# Patient Record
Sex: FEMALE | Race: WHITE | NOT HISPANIC OR LATINO | Employment: OTHER | URBAN - METROPOLITAN AREA
[De-identification: names, ages, dates, MRNs, and addresses within clinical notes are randomized per-mention and may not be internally consistent; named-entity substitution may affect disease eponyms.]

---

## 2017-04-13 ENCOUNTER — IMPORTED ENCOUNTER (OUTPATIENT)
Dept: URBAN - METROPOLITAN AREA CLINIC 43 | Facility: CLINIC | Age: 37
End: 2017-04-13

## 2017-04-13 PROBLEM — H33.312: Noted: 2017-04-13

## 2017-04-13 PROBLEM — H16.223: Noted: 2017-04-13

## 2017-11-28 ENCOUNTER — IMPORTED ENCOUNTER (OUTPATIENT)
Dept: URBAN - METROPOLITAN AREA CLINIC 43 | Facility: CLINIC | Age: 37
End: 2017-11-28

## 2017-11-28 PROBLEM — T15.11XA: Noted: 2017-11-28

## 2018-04-23 ENCOUNTER — IMPORTED ENCOUNTER (OUTPATIENT)
Dept: URBAN - METROPOLITAN AREA CLINIC 43 | Facility: CLINIC | Age: 38
End: 2018-04-23

## 2018-04-23 PROBLEM — H16.223: Noted: 2018-04-23

## 2018-04-25 ENCOUNTER — IMPORTED ENCOUNTER (OUTPATIENT)
Dept: URBAN - METROPOLITAN AREA CLINIC 43 | Facility: CLINIC | Age: 38
End: 2018-04-25

## 2019-04-08 ENCOUNTER — IMPORTED ENCOUNTER (OUTPATIENT)
Dept: URBAN - METROPOLITAN AREA CLINIC 43 | Facility: CLINIC | Age: 39
End: 2019-04-08

## 2019-04-08 PROBLEM — H16.223: Noted: 2019-04-08

## 2020-04-19 ASSESSMENT — TONOMETRY
OS_IOP_MMHG: 12.0
OS_IOP_MMHG: 11.0
OS_IOP_MMHG: 12.0
OD_IOP_MMHG: 12.0
OD_IOP_MMHG: 11.0
OD_IOP_MMHG: 10.0
OS_IOP_MMHG: 13.0
OD_IOP_MMHG: 10.0

## 2020-04-19 ASSESSMENT — VISUAL ACUITY
OS_CC: 20/20
OS_CC: J1
OD_CC: 20/20
OD_SC: J1
OS_CC: J1+
OD_SC: CF 2'
OD_CC: 20/20
OD_SC: CF 2'
OS_SC: J1
OS_SC: CF 2'
OD_CC: J1
OS_CC: 20/20
OD_CC: 20/20
OD_SC: CF 2'
OS_CC: 20/20
OS_SC: J1+
OS_CC: 20/20
OD_SC: J1+
OD_SC: J1+
OS_SC: CF 2'
OD_CC: J1+
OD_CC: 20/20
OS_SC: CF 2'
OS_SC: J1+

## 2020-04-19 ASSESSMENT — KERATOMETRY
OD_AXISANGLE2_DEGREES: 179
OD_AXISANGLE2_DEGREES: 1
OD_K1POWER_DIOPTERS: 42.75
OS_K1POWER_DIOPTERS: 43
OS_AXISANGLE_DEGREES: 65
OS_AXISANGLE2_DEGREES: 166
OS_K2POWER_DIOPTERS: 42.5
OD_K2POWER_DIOPTERS: 42.5
OS_AXISANGLE2_DEGREES: 155
OD_K1POWER_DIOPTERS: 43
OS_AXISANGLE_DEGREES: 76
OD_AXISANGLE_DEGREES: 83
OD_AXISANGLE_DEGREES: 91
OS_K2POWER_DIOPTERS: 42.25
OD_K2POWER_DIOPTERS: 42.25
OS_K1POWER_DIOPTERS: 42.75

## 2020-05-22 ENCOUNTER — CONTACT LENS EXAM ESTABLISHED (OUTPATIENT)
Dept: URBAN - METROPOLITAN AREA CLINIC 32 | Facility: CLINIC | Age: 40
End: 2020-05-22

## 2020-05-22 DIAGNOSIS — H16.223: ICD-10-CM

## 2020-05-22 PROCEDURE — 92310-1 LEVEL 1 CONTACT LENS MANAGEMENT

## 2020-05-22 PROCEDURE — 92014 COMPRE OPH EXAM EST PT 1/>: CPT

## 2020-05-22 ASSESSMENT — KERATOMETRY
OS_AXISANGLE2_DEGREES: 73
OD_K2POWER_DIOPTERS: 42.25
OS_AXISANGLE2_DEGREES: 65
OS_AXISANGLE_DEGREES: 155
OD_AXISANGLE2_DEGREES: 85
OD_AXISANGLE2_DEGREES: 85
OD_AXISANGLE_DEGREES: 175
OD_K2POWER_DIOPTERS: 42.75
OS_K2POWER_DIOPTERS: 43
OD_K1POWER_DIOPTERS: 42.25
OD_K1POWER_DIOPTERS: 42.75
OD_AXISANGLE2_DEGREES: 88
OD_AXISANGLE_DEGREES: 175
OD_AXISANGLE_DEGREES: 178
OS_K2POWER_DIOPTERS: 42.25
OS_K1POWER_DIOPTERS: 42.5
OS_K2POWER_DIOPTERS: 43
OD_K2POWER_DIOPTERS: 42.75
OS_K1POWER_DIOPTERS: 42.75
OS_K1POWER_DIOPTERS: 42.5
OS_AXISANGLE2_DEGREES: 65
OS_AXISANGLE_DEGREES: 163
OS_AXISANGLE_DEGREES: 155
OD_K1POWER_DIOPTERS: 42.25

## 2020-05-22 ASSESSMENT — VISUAL ACUITY
OD_SC: CF 2FT
OS_SC: CF 2FT
OD_SC: J1
OD_CC: J1+
OS_SC: J1
OS_CC: J1+
OD_CC: 20/20
OS_CC: 20/20

## 2020-05-22 ASSESSMENT — TONOMETRY
OD_IOP_MMHG: 12
OS_IOP_MMHG: 13

## 2020-10-02 NOTE — PATIENT DISCUSSION
Patient arrives complaining of swelling of the left upper eyelid that first occurred approximately two weeks ago. Patient expressed that his symptoms have improved significantly since the onset. After careful examination mild swelling was noted but no other significant abnormalities were apparent. Explained various possible causes in detail with the patient today. Suspect possible clogged oil glands involving the area. Due to the fact that the patient expresses significant improvement would not recommend any further intervention at this time. Recommend warm compresses twice daily for two weeks. Follow up for any recurrence.

## 2021-05-24 ENCOUNTER — CONTACT LENS EXAM ESTABLISHED (OUTPATIENT)
Dept: URBAN - METROPOLITAN AREA CLINIC 32 | Facility: CLINIC | Age: 41
End: 2021-05-24

## 2021-05-24 DIAGNOSIS — H16.223: ICD-10-CM

## 2021-05-24 DIAGNOSIS — H52.7: ICD-10-CM

## 2021-05-24 DIAGNOSIS — H33.302: ICD-10-CM

## 2021-05-24 PROCEDURE — 92014 COMPRE OPH EXAM EST PT 1/>: CPT

## 2021-05-24 PROCEDURE — 92310-1 LEVEL 1 CONTACT LENS MANAGEMENT

## 2021-05-24 ASSESSMENT — KERATOMETRY
OS_AXISANGLE_DEGREES: 155
OS_K1POWER_DIOPTERS: 42.5
OS_K2POWER_DIOPTERS: 43
OD_AXISANGLE_DEGREES: 175
OD_K1POWER_DIOPTERS: 42.25
OS_AXISANGLE2_DEGREES: 65
OD_K2POWER_DIOPTERS: 42.75
OD_AXISANGLE2_DEGREES: 85

## 2021-05-24 ASSESSMENT — VISUAL ACUITY
OS_SC: J1
OD_SC: CF 2FT
OD_SC: J1
OD_CC: J1+
OS_SC: CF 2FT
OS_CC: J1+
OS_CC: 20/20
OD_CC: 20/20

## 2021-05-24 ASSESSMENT — TONOMETRY
OS_IOP_MMHG: 15
OD_IOP_MMHG: 13

## 2021-11-16 NOTE — PATIENT DISCUSSION
NO SURGERY W/ RX: Not visually or functionally significant. Updated glasses/contacts rx given today. Patient to call with changes to vision.

## 2022-07-14 ENCOUNTER — COMPREHENSIVE EXAM (OUTPATIENT)
Dept: URBAN - METROPOLITAN AREA CLINIC 32 | Facility: CLINIC | Age: 42
End: 2022-07-14

## 2022-07-14 DIAGNOSIS — H33.302: ICD-10-CM

## 2022-07-14 DIAGNOSIS — H16.223: ICD-10-CM

## 2022-07-14 DIAGNOSIS — H52.7: ICD-10-CM

## 2022-07-14 PROCEDURE — 92310-1 LEVEL 1 CONTACT LENS MANAGEMENT

## 2022-07-14 PROCEDURE — 92015 DETERMINE REFRACTIVE STATE: CPT

## 2022-07-14 PROCEDURE — 92250 FUNDUS PHOTOGRAPHY W/I&R: CPT

## 2022-07-14 PROCEDURE — 99213 OFFICE O/P EST LOW 20 MIN: CPT

## 2022-07-14 ASSESSMENT — KERATOMETRY
OD_K2POWER_DIOPTERS: 42.75
OD_AXISANGLE_DEGREES: 175
OD_AXISANGLE2_DEGREES: 85
OS_K2POWER_DIOPTERS: 43
OD_K1POWER_DIOPTERS: 42.25
OS_AXISANGLE2_DEGREES: 65
OS_K1POWER_DIOPTERS: 42.5
OS_AXISANGLE_DEGREES: 155

## 2022-07-14 ASSESSMENT — VISUAL ACUITY
OS_SC: J1+ / -1
OD_SC: 20/20-1
OS_SC: 20/20
OD_SC: J1+ / -2

## 2022-07-14 ASSESSMENT — TONOMETRY
OS_IOP_MMHG: 14
OD_IOP_MMHG: 13

## 2023-07-31 ENCOUNTER — COMPREHENSIVE EXAM (OUTPATIENT)
Dept: URBAN - METROPOLITAN AREA CLINIC 32 | Facility: CLINIC | Age: 43
End: 2023-07-31

## 2023-07-31 DIAGNOSIS — H25.13: ICD-10-CM

## 2023-07-31 DIAGNOSIS — H33.302: ICD-10-CM

## 2023-07-31 DIAGNOSIS — H16.223: ICD-10-CM

## 2023-07-31 PROCEDURE — 92250 FUNDUS PHOTOGRAPHY W/I&R: CPT

## 2023-07-31 PROCEDURE — 92014 COMPRE OPH EXAM EST PT 1/>: CPT

## 2023-07-31 ASSESSMENT — VISUAL ACUITY
OS_SC: CF 6FT
OD_CC: J1+
OS_CC: 20/20
OD_SC: CF 6FT
OD_CC: 20/20
OS_CC: J1+

## 2023-07-31 ASSESSMENT — KERATOMETRY
OD_AXISANGLE_DEGREES: 1
OD_AXISANGLE2_DEGREES: 91
OS_AXISANGLE2_DEGREES: 80
OS_K2POWER_DIOPTERS: 42.25
OD_K2POWER_DIOPTERS: 42.25
OD_K1POWER_DIOPTERS: 42.50
OS_K1POWER_DIOPTERS: 42.75
OS_AXISANGLE_DEGREES: 170

## 2023-07-31 ASSESSMENT — TONOMETRY
OS_IOP_MMHG: 11
OD_IOP_MMHG: 12

## 2023-08-29 ENCOUNTER — EMERGENCY VISIT (OUTPATIENT)
Dept: URBAN - METROPOLITAN AREA CLINIC 32 | Facility: CLINIC | Age: 43
End: 2023-08-29

## 2023-08-29 DIAGNOSIS — H00.011: ICD-10-CM

## 2023-08-29 PROCEDURE — 99214 OFFICE O/P EST MOD 30 MIN: CPT

## 2023-08-29 RX ORDER — NEOMYCIN SULFATE, POLYMYXIN B SULFATE AND DEXAMETHASONE 3.5; 10000; 1 MG/G; [USP'U]/G; MG/G: OINTMENT OPHTHALMIC TWICE A DAY

## 2023-08-29 ASSESSMENT — KERATOMETRY
OS_K1POWER_DIOPTERS: 42.75
OS_AXISANGLE2_DEGREES: 80
OD_K2POWER_DIOPTERS: 42.25
OS_K2POWER_DIOPTERS: 42.25
OD_AXISANGLE2_DEGREES: 91
OD_AXISANGLE_DEGREES: 1
OD_K1POWER_DIOPTERS: 42.50
OS_AXISANGLE_DEGREES: 170

## 2023-08-29 ASSESSMENT — TONOMETRY
OD_IOP_MMHG: 15
OS_IOP_MMHG: 15

## 2023-08-29 ASSESSMENT — VISUAL ACUITY
OD_CC: 20/20
OS_CC: 20/20

## 2024-08-29 ENCOUNTER — COMPREHENSIVE EXAM (OUTPATIENT)
Dept: URBAN - METROPOLITAN AREA CLINIC 32 | Facility: CLINIC | Age: 44
End: 2024-08-29

## 2024-08-29 DIAGNOSIS — H25.13: ICD-10-CM

## 2024-08-29 DIAGNOSIS — H33.302: ICD-10-CM

## 2024-08-29 DIAGNOSIS — H16.223: ICD-10-CM

## 2024-08-29 PROCEDURE — 92250 FUNDUS PHOTOGRAPHY W/I&R: CPT

## 2024-08-29 PROCEDURE — 92310-1 LEVEL 1 CONTACT LENS MANAGEMENT

## 2024-08-29 PROCEDURE — 92014 COMPRE OPH EXAM EST PT 1/>: CPT

## 2024-08-29 ASSESSMENT — TONOMETRY
OS_IOP_MMHG: 11
OD_IOP_MMHG: 11

## 2024-08-29 ASSESSMENT — KERATOMETRY
OD_K2POWER_DIOPTERS: 42.25
OS_AXISANGLE_DEGREES: 170
OD_AXISANGLE_DEGREES: 1
OD_K1POWER_DIOPTERS: 42.50
OD_AXISANGLE2_DEGREES: 91
OS_K2POWER_DIOPTERS: 42.25
OS_K1POWER_DIOPTERS: 42.75
OS_AXISANGLE2_DEGREES: 80

## 2024-08-29 ASSESSMENT — VISUAL ACUITY
OD_SC: J1+
OS_SC: J1+
OS_SC: CF 6FT
OD_SC: CF 6FT

## 2025-07-08 ENCOUNTER — COMPREHENSIVE EXAM (OUTPATIENT)
Age: 45
End: 2025-07-08

## 2025-07-08 DIAGNOSIS — H33.302: ICD-10-CM

## 2025-07-08 DIAGNOSIS — H16.223: ICD-10-CM

## 2025-07-08 DIAGNOSIS — H25.13: ICD-10-CM

## 2025-07-08 PROCEDURE — 99213 OFFICE O/P EST LOW 20 MIN: CPT

## 2025-07-08 PROCEDURE — 92015 DETERMINE REFRACTIVE STATE: CPT

## 2025-07-08 PROCEDURE — 92250 FUNDUS PHOTOGRAPHY W/I&R: CPT

## 2025-07-08 PROCEDURE — 92310-1 LEVEL 1 SOFT LENS UPDATE
